# Patient Record
Sex: MALE | Race: WHITE
[De-identification: names, ages, dates, MRNs, and addresses within clinical notes are randomized per-mention and may not be internally consistent; named-entity substitution may affect disease eponyms.]

---

## 2018-01-19 ENCOUNTER — HOSPITAL ENCOUNTER (OUTPATIENT)
Dept: HOSPITAL 92 - LABBT | Age: 83
Discharge: HOME | End: 2018-01-19
Attending: INTERNAL MEDICINE
Payer: MEDICARE

## 2018-01-19 DIAGNOSIS — R07.9: ICD-10-CM

## 2018-01-19 DIAGNOSIS — Z01.810: Primary | ICD-10-CM

## 2018-01-19 LAB
ALBUMIN SERPL BCG-MCNC: 3.8 G/DL (ref 3.4–4.8)
ALP SERPL-CCNC: 76 U/L (ref 40–150)
ALT SERPL W P-5'-P-CCNC: 16 U/L (ref 8–55)
ANION GAP SERPL CALC-SCNC: 13 MMOL/L (ref 10–20)
APTT PPP: 31.7 SEC (ref 22.9–36.1)
AST SERPL-CCNC: 18 U/L (ref 5–34)
BILIRUB SERPL-MCNC: 0.7 MG/DL (ref 0.2–1.2)
BUN SERPL-MCNC: 28 MG/DL (ref 8.4–25.7)
CALCIUM SERPL-MCNC: 8.7 MG/DL (ref 7.8–10.44)
CHLORIDE SERPL-SCNC: 102 MMOL/L (ref 98–107)
CO2 SERPL-SCNC: 23 MMOL/L (ref 23–31)
CREAT CL PREDICTED SERPL C-G-VRATE: 0 ML/MIN (ref 70–130)
GLOBULIN SER CALC-MCNC: 2.3 G/DL (ref 2.4–3.5)
GLUCOSE SERPL-MCNC: 101 MG/DL (ref 83–110)
HGB BLD-MCNC: 13.3 G/DL (ref 14–18)
INR PPP: 1.1
MCH RBC QN AUTO: 33.4 PG (ref 27–31)
MCV RBC AUTO: 100 FL (ref 80–94)
PLATELET # BLD AUTO: 180 THOU/UL (ref 130–400)
POTASSIUM SERPL-SCNC: 5.1 MMOL/L (ref 3.5–5.1)
PROTHROMBIN TIME: 14 SEC (ref 12–14.7)
RBC # BLD AUTO: 3.99 MILL/UL (ref 4.7–6.1)
SODIUM SERPL-SCNC: 133 MMOL/L (ref 136–145)
WBC # BLD AUTO: 6 THOU/UL (ref 4.8–10.8)

## 2018-01-19 PROCEDURE — 85730 THROMBOPLASTIN TIME PARTIAL: CPT

## 2018-01-19 PROCEDURE — 93005 ELECTROCARDIOGRAM TRACING: CPT

## 2018-01-19 PROCEDURE — 85610 PROTHROMBIN TIME: CPT

## 2018-01-19 PROCEDURE — 80053 COMPREHEN METABOLIC PANEL: CPT

## 2018-01-19 PROCEDURE — 85027 COMPLETE CBC AUTOMATED: CPT

## 2018-01-19 PROCEDURE — 93010 ELECTROCARDIOGRAM REPORT: CPT

## 2018-01-20 NOTE — EKG
Test Reason : 

Blood Pressure : ***/*** mmHG

Vent. Rate : 063 BPM     Atrial Rate : 063 BPM

   P-R Int : 226 ms          QRS Dur : 112 ms

    QT Int : 414 ms       P-R-T Axes : 069 -57 044 degrees

   QTc Int : 423 ms

 

Sinus rhythm with 1st degree A-V block

Left anterior fascicular block

Abnormal ECG

When compared with ECG of 03-MAR-2016 13:43,

No significant change was found

Confirmed by VANITA LEWIS, DR. SAlfonso (4) on 1/20/2018 7:16:59 AM

 

Referred By:  ANEL           Confirmed By:DR. HANH WALDRON MD

## 2018-01-23 ENCOUNTER — HOSPITAL ENCOUNTER (OUTPATIENT)
Dept: HOSPITAL 92 - CCL | Age: 83
End: 2018-01-23
Attending: INTERNAL MEDICINE
Payer: MEDICARE

## 2018-01-23 VITALS — BODY MASS INDEX: 27.4 KG/M2

## 2018-01-23 DIAGNOSIS — G47.30: ICD-10-CM

## 2018-01-23 DIAGNOSIS — I48.1: ICD-10-CM

## 2018-01-23 DIAGNOSIS — Z98.890: ICD-10-CM

## 2018-01-23 DIAGNOSIS — I25.10: ICD-10-CM

## 2018-01-23 DIAGNOSIS — Z99.89: ICD-10-CM

## 2018-01-23 DIAGNOSIS — R07.89: Primary | ICD-10-CM

## 2018-01-23 DIAGNOSIS — Z90.49: ICD-10-CM

## 2018-01-23 DIAGNOSIS — I10: ICD-10-CM

## 2018-01-23 LAB
CHD RISK SERPL-RTO: 2.6 (ref ?–4.5)
CHOLEST SERPL-MCNC: 125 MG/DL
HDLC SERPL-MCNC: 48 MG/DL
LDLC SERPL CALC-MCNC: 64 MG/DL
TRIGL SERPL-MCNC: 67 MG/DL (ref ?–150)

## 2018-01-23 PROCEDURE — 80061 LIPID PANEL: CPT

## 2018-01-23 PROCEDURE — C1769 GUIDE WIRE: HCPCS

## 2018-01-23 PROCEDURE — 76942 ECHO GUIDE FOR BIOPSY: CPT

## 2018-01-23 PROCEDURE — 93567 NJX CAR CTH SPRVLV AORTGRPHY: CPT

## 2018-01-23 PROCEDURE — 93455 CORONARY ART/GRFT ANGIO S&I: CPT

## 2018-07-05 ENCOUNTER — HOSPITAL ENCOUNTER (EMERGENCY)
Dept: HOSPITAL 92 - SCSER | Age: 83
Discharge: HOME | End: 2018-07-05
Payer: MEDICARE

## 2018-07-05 DIAGNOSIS — S00.31XA: ICD-10-CM

## 2018-07-05 DIAGNOSIS — S00.81XA: ICD-10-CM

## 2018-07-05 DIAGNOSIS — K21.9: ICD-10-CM

## 2018-07-05 DIAGNOSIS — S09.90XA: Primary | ICD-10-CM
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
## 2018-07-05 DIAGNOSIS — S61.411A: ICD-10-CM

## 2018-07-05 PROCEDURE — 70450 CT HEAD/BRAIN W/O DYE: CPT

## 2018-07-05 NOTE — CT
CT BRAIN WITHOUT CONTRAST:

 

Comparison: None. 

 

History: Fall. Head trauma. Tripped over a dog at home. Patient had no severe trauma to the forehead.
 

 

Technique: Multiple contiguous axial images were obtained in a CT of the brain without contrast. 

 

FINDINGS: 

The brain is normal in morphology and attenuation without focal lesions or confluent areas of infarct
ion. There is no evidence of hydrocephalus, intracranial hemorrhage or extraaxial fluid collection. 

 

Soft tissue swelling is seen in the right frontal scalp. The underlying calvarium is unremarkable. Th
e visualized paranasal sinuses and mastoid air cells are well aerated. 

 

IMPRESSION: 

No evidence of acute intracranial abnormality.

 

POS: [facility]

## 2019-04-28 ENCOUNTER — HOSPITAL ENCOUNTER (INPATIENT)
Dept: HOSPITAL 92 - SCSER | Age: 84
LOS: 4 days | Discharge: HOME | DRG: 872 | End: 2019-05-02
Attending: HOSPITALIST | Admitting: HOSPITALIST
Payer: MEDICARE

## 2019-04-28 VITALS — BODY MASS INDEX: 30.3 KG/M2

## 2019-04-28 DIAGNOSIS — E87.1: ICD-10-CM

## 2019-04-28 DIAGNOSIS — G47.33: ICD-10-CM

## 2019-04-28 DIAGNOSIS — Z85.3: ICD-10-CM

## 2019-04-28 DIAGNOSIS — Z79.82: ICD-10-CM

## 2019-04-28 DIAGNOSIS — Z79.899: ICD-10-CM

## 2019-04-28 DIAGNOSIS — Z90.49: ICD-10-CM

## 2019-04-28 DIAGNOSIS — K21.9: ICD-10-CM

## 2019-04-28 DIAGNOSIS — G25.81: ICD-10-CM

## 2019-04-28 DIAGNOSIS — A41.9: Primary | ICD-10-CM

## 2019-04-28 DIAGNOSIS — R19.7: ICD-10-CM

## 2019-04-28 DIAGNOSIS — Z88.8: ICD-10-CM

## 2019-04-28 DIAGNOSIS — Z95.1: ICD-10-CM

## 2019-04-28 DIAGNOSIS — Z66: ICD-10-CM

## 2019-04-28 DIAGNOSIS — I25.10: ICD-10-CM

## 2019-04-28 DIAGNOSIS — E66.9: ICD-10-CM

## 2019-04-28 LAB
ALBUMIN SERPL BCG-MCNC: 4 G/DL (ref 3.4–4.8)
ALP SERPL-CCNC: 81 U/L (ref 40–150)
ALT SERPL W P-5'-P-CCNC: 19 U/L (ref 8–55)
ANION GAP SERPL CALC-SCNC: 14 MMOL/L (ref 10–20)
AST SERPL-CCNC: 15 U/L (ref 5–34)
BASOPHILS # BLD AUTO: 0.1 THOU/UL (ref 0–0.2)
BASOPHILS NFR BLD AUTO: 0.7 % (ref 0–1)
BILIRUB SERPL-MCNC: 1 MG/DL (ref 0.2–1.2)
BUN SERPL-MCNC: 27 MG/DL (ref 8.4–25.7)
CALCIUM SERPL-MCNC: 9.2 MG/DL (ref 7.8–10.44)
CHLORIDE SERPL-SCNC: 100 MMOL/L (ref 98–107)
CK MB SERPL-MCNC: 1.1 NG/ML (ref 0–6.6)
CO2 SERPL-SCNC: 22 MMOL/L (ref 23–31)
CREAT CL PREDICTED SERPL C-G-VRATE: 0 ML/MIN (ref 70–130)
EOSINOPHIL # BLD AUTO: 0 THOU/UL (ref 0–0.7)
EOSINOPHIL NFR BLD AUTO: 0.1 % (ref 0–10)
GLOBULIN SER CALC-MCNC: 3 G/DL (ref 2.4–3.5)
GLUCOSE SERPL-MCNC: 121 MG/DL (ref 83–110)
HGB BLD-MCNC: 12.3 G/DL (ref 14–18)
LIPASE SERPL-CCNC: 19 U/L (ref 8–78)
LYMPHOCYTES # BLD: 0.8 THOU/UL (ref 1.2–3.4)
LYMPHOCYTES NFR BLD AUTO: 8.6 % (ref 21–51)
MACROCYTES BLD QL SMEAR: (no result) (100X)
MCH RBC QN AUTO: 34.6 PG (ref 27–31)
MCV RBC AUTO: 99.1 FL (ref 78–98)
MDIFF COMPLETE?: YES
MONOCYTES # BLD AUTO: 0.7 THOU/UL (ref 0.11–0.59)
MONOCYTES NFR BLD AUTO: 7.2 % (ref 0–10)
NEUTROPHILS # BLD AUTO: 7.8 THOU/UL (ref 1.4–6.5)
NEUTROPHILS NFR BLD AUTO: 83.4 % (ref 42–75)
PLATELET # BLD AUTO: 142 THOU/UL (ref 130–400)
POTASSIUM SERPL-SCNC: 4.3 MMOL/L (ref 3.5–5.1)
PROT UR STRIP.AUTO-MCNC: 30 MG/DL
RBC # BLD AUTO: 3.56 MILL/UL (ref 4.7–6.1)
SODIUM SERPL-SCNC: 132 MMOL/L (ref 136–145)
SP GR UR STRIP: 1.02 (ref 1–1.03)
WBC # BLD AUTO: 9.4 THOU/UL (ref 4.8–10.8)

## 2019-04-28 PROCEDURE — 83690 ASSAY OF LIPASE: CPT

## 2019-04-28 PROCEDURE — 93005 ELECTROCARDIOGRAM TRACING: CPT

## 2019-04-28 PROCEDURE — 81015 MICROSCOPIC EXAM OF URINE: CPT

## 2019-04-28 PROCEDURE — 83605 ASSAY OF LACTIC ACID: CPT

## 2019-04-28 PROCEDURE — 80202 ASSAY OF VANCOMYCIN: CPT

## 2019-04-28 PROCEDURE — 36415 COLL VENOUS BLD VENIPUNCTURE: CPT

## 2019-04-28 PROCEDURE — 96375 TX/PRO/DX INJ NEW DRUG ADDON: CPT

## 2019-04-28 PROCEDURE — 80048 BASIC METABOLIC PNL TOTAL CA: CPT

## 2019-04-28 PROCEDURE — 87449 NOS EACH ORGANISM AG IA: CPT

## 2019-04-28 PROCEDURE — 87324 CLOSTRIDIUM AG IA: CPT

## 2019-04-28 PROCEDURE — 80053 COMPREHEN METABOLIC PANEL: CPT

## 2019-04-28 PROCEDURE — 87081 CULTURE SCREEN ONLY: CPT

## 2019-04-28 PROCEDURE — 87804 INFLUENZA ASSAY W/OPTIC: CPT

## 2019-04-28 PROCEDURE — 96361 HYDRATE IV INFUSION ADD-ON: CPT

## 2019-04-28 PROCEDURE — 87086 URINE CULTURE/COLONY COUNT: CPT

## 2019-04-28 PROCEDURE — 81003 URINALYSIS AUTO W/O SCOPE: CPT

## 2019-04-28 PROCEDURE — 87633 RESP VIRUS 12-25 TARGETS: CPT

## 2019-04-28 PROCEDURE — 87040 BLOOD CULTURE FOR BACTERIA: CPT

## 2019-04-28 PROCEDURE — 87430 STREP A AG IA: CPT

## 2019-04-28 PROCEDURE — 84484 ASSAY OF TROPONIN QUANT: CPT

## 2019-04-28 PROCEDURE — 71046 X-RAY EXAM CHEST 2 VIEWS: CPT

## 2019-04-28 PROCEDURE — 96365 THER/PROPH/DIAG IV INF INIT: CPT

## 2019-04-28 PROCEDURE — 84443 ASSAY THYROID STIM HORMONE: CPT

## 2019-04-28 PROCEDURE — 83880 ASSAY OF NATRIURETIC PEPTIDE: CPT

## 2019-04-28 PROCEDURE — 82553 CREATINE MB FRACTION: CPT

## 2019-04-28 PROCEDURE — 85025 COMPLETE CBC W/AUTO DIFF WBC: CPT

## 2019-04-28 NOTE — RAD
PA AND LATERAL CHEST:

 

History: Fever. Right breast cancer. 

 

FINDINGS: 

Comparison is made with exam of 9-24-15. 

 

There are changes of median sternotomy. The heart size is enlarged. The aorta is tortuous. The lungs 
are expanded without lobar consolidation, pneumothorax, sonia pulmonary edema or pleural effusions. T
here are degenerative changes in the acromioclavicular joints.  

 

IMPRESSION: 

No acute process. 

 

POS: [facility]

## 2019-04-29 LAB
HYALINE CASTS #/AREA URNS LPF: (no result) LPF
RBC UR QL AUTO: (no result) HPF (ref 0–3)
TROPONIN I SERPL DL<=0.01 NG/ML-MCNC: 0.02 NG/ML (ref ?–0.03)
TROPONIN I SERPL DL<=0.01 NG/ML-MCNC: 0.03 NG/ML (ref ?–0.03)
WBC UR QL AUTO: (no result) HPF (ref 0–3)

## 2019-04-29 RX ADMIN — VANCOMYCIN HYDROCHLORIDE SCH MLS: 1 INJECTION, POWDER, LYOPHILIZED, FOR SOLUTION INTRAVENOUS at 17:04

## 2019-04-29 NOTE — HP
PRIMARY CARE PROVIDER:  Dr. Dave Monterroso.



CHIEF COMPLAINT:  Fever.



HISTORY OF PRESENT ILLNESS:  Mr. Neal is a pleasant 87-year-old gentleman, 
who was

seen at [facility] on April 29 2019, following transfer 
from

[facility]. 



He reports that he was diagnosed with right foot infection approximately 10 days

ago.  He was started on an antibiotic.  Emergency room records state that he was

started on Bactrim.  The patient thinks he may have been on amoxicillin.  He 
reports

completing eight days of treatment, out of a 10-day course.  Yesterday, he 
started

having fevers.  He also felt nauseous twice, but did not vomit.  He felt 
generally

weak.  He denies any chest pain or palpitations.  He denies any 
lightheadedness.  He

presented to the emergency room because of fevers. 



In the emergency room, he was diagnosed with sepsis and started on vancomycin 
and

Zosyn, and admitted to the hospital. 



He denies any cough or shortness of breath.



REVIEW OF SYSTEMS:  All other systems reviewed and found to be negative.



PAST MEDICAL HISTORY:  

1. Obstructive sleep apnea syndrome.

2. Gastroesophageal reflux disease.

3. Restless legs syndrome.

4. Right-sided breast cancer.



PAST SURGICAL HISTORY:  Right mastectomy, transurethral resection of prostate,

appendectomy, cholecystectomy, coronary artery bypass graft. 



SOCIAL HISTORY:  The patient denies tobacco use, alcohol use, or recreational 
drug

use. 



ALLERGIES:  MYCINS.



CODE STATUS:  I discussed his code status.  He is DNAR.



CURRENT MEDICATIONS:  

1. Pramipexole 1.5 mg daily.

2. Aspirin 325 mg daily.

3. Atorvastatin 20 mg daily.

4. Metoprolol tartrate 50 mg 2 times a day.

5. Lisinopril 5 mg daily.

6. Melatonin 10 mg daily.

7. Calcipotriene-betamethasone topical as needed..

8. Alclometasone cream as needed.

9. Culturelle one capsule daily.

10. Isosorbide mononitrate 60 mg daily.

11. Bactrim DS one tablet two times a day.

12. Simvastatin 40 mg at bedtime.

13. Toprol-XL 25 mg 3 times a day.

14. Mirapex 0.5 mg daily.



FAMILY HISTORY:  No family history of premature coronary artery disease.



PHYSICAL EXAMINATION:

GENERAL:  On examination, Mr. Neal is awake and alert, not in acute distress.
  He

is obese, with a BMI of 30.3. 

VITAL SIGNS:  He has a blood pressure of 154/67, pulse 87, respiratory rate 16, 
and

oxygen saturation 93% on room air.  He is currently afebrile.  In the emergency

room, he had temperature of 102.6 degrees Fahrenheit and pulse of 95. 

EYES:  No scleral icterus, no conjunctival pallor. 

ENT:  Dry mucosal membranes.  No oropharyngeal erythema or exudates. 

NECK:  Supple, nontender, trachea is midline. 

RESPIRATORY:  Accessory muscles of breathing are not active.  Chest wall 
movements

are symmetric bilaterally.  Lungs are clear to auscultation without wheeze, 
rhonchi,

or crepitations. 

CARDIOVASCULAR:  S1 and S2 are heard, regular.  Peripheral pulses palpable.  No

carotid bruit.  No pericardial rub. 

ABDOMEN:  Soft, nontender, bowel sounds are heard, no hepatomegaly, no 
splenomegaly. 

NEUROLOGIC:  Cranial nerves 2 through 12 intact, deep tendon reflexes 2+. 

MUSCULOSKELETAL:  Power is 5/5 in all 4 extremities. 

SKIN:  Small area of faint erythema over the right shin, no elevated warmth.  No

neurovascular deficits in that extremity. 

LYMPHATIC:  No cervical lymphadenopathy. 

PSYCHIATRIC:  Normal mood, normal affect, the patient is oriented to person, 
place,

and time. 



LABORATORY DATA:  Mr. Neal's labs and investigations were reviewed.  I 
reviewed

his electrocardiogram, which shows normal sinus rhythm, no ST changes to 
suggest an

acute coronary syndrome.  I also reviewed his chest x-ray, which does not show 
any

pulmonary infiltrates.  He has normal white count, elevated neutrophil 
percentage of

83.4, macrocytic anemia with hemoglobin 12.3, normal platelet count.  
Hyponatremia

with sodium 132, normal potassium, elevated blood urea nitrogen of 27, normal

creatinine, unremarkable liver profile, normal lipase, BNP elevated at 274,

troponin-I initially in the indeterminate range at 0.029, normalizing to 0.022,

normal lactic acid level of 1.0, and urinalysis, that is negative for nitrite 
and

leukocyte esterase. 



ASSESSMENT AND PLAN:  Mr. Neal is a pleasant 87-year-old gentleman, who was 
seen

at [facility] on April 29, 2019.  His problem list 
includes: 

1. Sepsis:  Mr. Neal is presenting with sepsis, source unclear at this time.  
He

was recently treated with Bactrim.  He will be admitted to the hospital for 
further

management.  He has been started on vancomycin and Zosyn, which I will continue.

Please note that his group A strep screen was negative.  His influenza screen 
was

negative as well.  He has recently been treated with cellulitis and it does not

appear that he has significant area of cellulitis at this time. 

2. Coronary artery disease:  Stable.  The patient denies any chest pain.

3. Gastroesophageal reflux disease:  Stable.

4. Restless legs syndrome:  We will continue home medications including Mirapex.

5. Hyponatremia:  Mild, likely asymptomatic, we will recheck.

6. Generalized weakness:  We will evaluate his mobility after he receives

antibiotics. 

Many thanks for allowing me to participate in your patient's care.  Please feel 
free

to contact me with any questions or concerns. 



LEVEL OF RISK:  High.



LEVEL OF COMPLEXITY:  High.







Job ID:  [id number]



[facility]

## 2019-04-30 LAB
ANION GAP SERPL CALC-SCNC: 12 MMOL/L (ref 10–20)
BASOPHILS # BLD AUTO: 0 THOU/UL (ref 0–0.2)
BASOPHILS NFR BLD AUTO: 0.6 % (ref 0–1)
BUN SERPL-MCNC: 22 MG/DL (ref 8.4–25.7)
CALCIUM SERPL-MCNC: 8 MG/DL (ref 7.8–10.44)
CHLORIDE SERPL-SCNC: 102 MMOL/L (ref 98–107)
CO2 SERPL-SCNC: 20 MMOL/L (ref 23–31)
CREAT CL PREDICTED SERPL C-G-VRATE: 62 ML/MIN (ref 70–130)
EOSINOPHIL # BLD AUTO: 0 THOU/UL (ref 0–0.7)
EOSINOPHIL NFR BLD AUTO: 0.4 % (ref 0–10)
GLUCOSE SERPL-MCNC: 112 MG/DL (ref 83–110)
HGB BLD-MCNC: 10.8 G/DL (ref 14–18)
LYMPHOCYTES # BLD: 0.4 THOU/UL (ref 1.2–3.4)
LYMPHOCYTES NFR BLD AUTO: 5.6 % (ref 21–51)
MCH RBC QN AUTO: 33.5 PG (ref 27–31)
MCV RBC AUTO: 103 FL (ref 78–98)
MONOCYTES # BLD AUTO: 0.6 THOU/UL (ref 0.11–0.59)
MONOCYTES NFR BLD AUTO: 8.2 % (ref 0–10)
NEUTROPHILS # BLD AUTO: 6.4 THOU/UL (ref 1.4–6.5)
NEUTROPHILS NFR BLD AUTO: 85.3 % (ref 42–75)
PLATELET # BLD AUTO: 148 THOU/UL (ref 130–400)
POTASSIUM SERPL-SCNC: 4.3 MMOL/L (ref 3.5–5.1)
RBC # BLD AUTO: 3.22 MILL/UL (ref 4.7–6.1)
SODIUM SERPL-SCNC: 130 MMOL/L (ref 136–145)
VANCOMYCIN TROUGH SERPL-MCNC: 6.9 UG/ML
WBC # BLD AUTO: 7.5 THOU/UL (ref 4.8–10.8)

## 2019-04-30 RX ADMIN — LACTOBACILLUS ACIDOPH-L.BULGARICUS 1 MILLION CELL CHEWABLE TABLET SCH TAB: at 09:03

## 2019-04-30 RX ADMIN — VANCOMYCIN HYDROCHLORIDE SCH MLS: 1 INJECTION, POWDER, LYOPHILIZED, FOR SOLUTION INTRAVENOUS at 18:37

## 2019-05-01 LAB
ANION GAP SERPL CALC-SCNC: 11 MMOL/L (ref 10–20)
BASOPHILS # BLD AUTO: 0 THOU/UL (ref 0–0.2)
BASOPHILS NFR BLD AUTO: 0 % (ref 0–1)
BUN SERPL-MCNC: 20 MG/DL (ref 8.4–25.7)
CALCIUM SERPL-MCNC: 8 MG/DL (ref 7.8–10.44)
CHLORIDE SERPL-SCNC: 102 MMOL/L (ref 98–107)
CO2 SERPL-SCNC: 19 MMOL/L (ref 23–31)
CREAT CL PREDICTED SERPL C-G-VRATE: 78 ML/MIN (ref 70–130)
EOSINOPHIL # BLD AUTO: 0.1 THOU/UL (ref 0–0.7)
EOSINOPHIL NFR BLD AUTO: 1.7 % (ref 0–10)
GLUCOSE SERPL-MCNC: 95 MG/DL (ref 83–110)
HGB BLD-MCNC: 10.6 G/DL (ref 14–18)
LYMPHOCYTES # BLD: 0.6 THOU/UL (ref 1.2–3.4)
LYMPHOCYTES NFR BLD AUTO: 11.1 % (ref 21–51)
MCH RBC QN AUTO: 35.4 PG (ref 27–31)
MCV RBC AUTO: 104 FL (ref 78–98)
MONOCYTES # BLD AUTO: 0.5 THOU/UL (ref 0.11–0.59)
MONOCYTES NFR BLD AUTO: 10.2 % (ref 0–10)
NEUTROPHILS # BLD AUTO: 3.9 THOU/UL (ref 1.4–6.5)
NEUTROPHILS NFR BLD AUTO: 77.1 % (ref 42–75)
PLATELET # BLD AUTO: 141 THOU/UL (ref 130–400)
POTASSIUM SERPL-SCNC: 4 MMOL/L (ref 3.5–5.1)
RBC # BLD AUTO: 3 MILL/UL (ref 4.7–6.1)
SODIUM SERPL-SCNC: 128 MMOL/L (ref 136–145)
WBC # BLD AUTO: 5 THOU/UL (ref 4.8–10.8)

## 2019-05-01 RX ADMIN — LACTOBACILLUS ACIDOPH-L.BULGARICUS 1 MILLION CELL CHEWABLE TABLET SCH TAB: at 08:53

## 2019-05-02 VITALS — DIASTOLIC BLOOD PRESSURE: 65 MMHG | TEMPERATURE: 97.9 F | SYSTOLIC BLOOD PRESSURE: 123 MMHG

## 2019-05-02 LAB
ANION GAP SERPL CALC-SCNC: 13 MMOL/L (ref 10–20)
BASOPHILS # BLD AUTO: 0 THOU/UL (ref 0–0.2)
BASOPHILS NFR BLD AUTO: 0.4 % (ref 0–1)
BUN SERPL-MCNC: 18 MG/DL (ref 8.4–25.7)
CALCIUM SERPL-MCNC: 8.3 MG/DL (ref 7.8–10.44)
CHLORIDE SERPL-SCNC: 101 MMOL/L (ref 98–107)
CO2 SERPL-SCNC: 18 MMOL/L (ref 23–31)
CREAT CL PREDICTED SERPL C-G-VRATE: 84 ML/MIN (ref 70–130)
EOSINOPHIL # BLD AUTO: 0.1 THOU/UL (ref 0–0.7)
EOSINOPHIL NFR BLD AUTO: 2.8 % (ref 0–10)
GLUCOSE SERPL-MCNC: 94 MG/DL (ref 83–110)
HGB BLD-MCNC: 11.2 G/DL (ref 14–18)
LYMPHOCYTES # BLD: 0.6 THOU/UL (ref 1.2–3.4)
LYMPHOCYTES NFR BLD AUTO: 14.6 % (ref 21–51)
MCH RBC QN AUTO: 34.9 PG (ref 27–31)
MCV RBC AUTO: 101 FL (ref 78–98)
MONOCYTES # BLD AUTO: 0.4 THOU/UL (ref 0.11–0.59)
MONOCYTES NFR BLD AUTO: 10.6 % (ref 0–10)
NEUTROPHILS # BLD AUTO: 2.9 THOU/UL (ref 1.4–6.5)
NEUTROPHILS NFR BLD AUTO: 71.6 % (ref 42–75)
PLATELET # BLD AUTO: 163 THOU/UL (ref 130–400)
POTASSIUM SERPL-SCNC: 4 MMOL/L (ref 3.5–5.1)
RBC # BLD AUTO: 3.22 MILL/UL (ref 4.7–6.1)
SODIUM SERPL-SCNC: 128 MMOL/L (ref 136–145)
WBC # BLD AUTO: 4 THOU/UL (ref 4.8–10.8)

## 2019-05-02 RX ADMIN — LACTOBACILLUS ACIDOPH-L.BULGARICUS 1 MILLION CELL CHEWABLE TABLET SCH TAB: at 09:37

## 2019-05-03 NOTE — DIS
DATE OF ADMISSION:  04/29/2019



DATE OF DISCHARGE:  05/02/2019



PRIMARY CARE PROVIDER:  Dave Monterroso MD.



DISCHARGE DIAGNOSES:  

1. Sepsis.

2. Most likely viral etiology for sepsis.

3. Diarrhea, resolved.



CONDITION OF PATIENT ON THE DAY OF DISCHARGE:  Patient on the day of discharge:

Stable.  I assessed Mr. Neal on the day of discharge.  He denies any chest pain or

shortness of breath.  Vital signs are stable.  S1 and S2 are heard, regular.  Lungs

are clear to auscultation bilaterally. 



DISCHARGE MEDICATIONS:  No change was made to his pre-admission home medications as

dictated on my history and physical note dated April 29, 2019. 



HOSPITAL COURSE:  Mr. Neal is a pleasant 87-year-old gentleman, who was admitted

to [facility] on April 29th, 2019, for sepsis.  He was

admitted to the hospital and treated with intravenous antibiotics.  Preliminary

blood cultures were negative at 48 hours, the patient is advised to follow up with

his primary care provider for final blood culture report.  Group A Streptococcus

culture from the throat did not show any group A Streptococcus isolate.  Final urine

culture did not show any growth at 48 hours.  Influenza screen was negative.

Respiratory virus panel was also negative.  He had diarrhea towards the end of this

hospitalization.  Clostridium difficile toxin test was negative.  He improved with

probiotics. 



Antibiotics were discontinued and the patient was observed in the hospital for one

day.  He did not have any fevers.  He is being discharged home in a stable

condition. 



On the day of discharge, he has sodium 128.  He has chronic hyponatremia and has

been advised to have his sodium level rechecked through his primary care provider's

office.  On the day of discharge, his creatinine is 0.82, white count 4000,

hemoglobin 11.2, and platelet count 163,000.  His TSH was normal at 1.394. 



Many thanks for allowing me to participate in your patient's care.  Please feel free

to contact me with any questions or concerns. 



DISCHARGE DESTINATION:  Home.



TIME SPENT:  Total amount of time spent coordinating this discharge:  32 minutes.







Job ID:  [id number]

## 2020-08-31 ENCOUNTER — HOSPITAL ENCOUNTER (INPATIENT)
Dept: HOSPITAL 92 - ERS | Age: 85
LOS: 2 days | Discharge: HOME | DRG: 683 | End: 2020-09-02
Attending: FAMILY MEDICINE | Admitting: FAMILY MEDICINE
Payer: MEDICARE

## 2020-08-31 VITALS — BODY MASS INDEX: 26.9 KG/M2

## 2020-08-31 DIAGNOSIS — K21.9: ICD-10-CM

## 2020-08-31 DIAGNOSIS — Z90.49: ICD-10-CM

## 2020-08-31 DIAGNOSIS — N18.3: ICD-10-CM

## 2020-08-31 DIAGNOSIS — Z20.828: ICD-10-CM

## 2020-08-31 DIAGNOSIS — D63.1: ICD-10-CM

## 2020-08-31 DIAGNOSIS — G25.81: ICD-10-CM

## 2020-08-31 DIAGNOSIS — E87.2: ICD-10-CM

## 2020-08-31 DIAGNOSIS — N17.9: Primary | ICD-10-CM

## 2020-08-31 DIAGNOSIS — Z95.1: ICD-10-CM

## 2020-08-31 DIAGNOSIS — G47.33: ICD-10-CM

## 2020-08-31 DIAGNOSIS — I25.10: ICD-10-CM

## 2020-08-31 DIAGNOSIS — Z85.3: ICD-10-CM

## 2020-08-31 DIAGNOSIS — I12.9: ICD-10-CM

## 2020-08-31 DIAGNOSIS — Z79.899: ICD-10-CM

## 2020-08-31 DIAGNOSIS — E87.5: ICD-10-CM

## 2020-08-31 DIAGNOSIS — E86.0: ICD-10-CM

## 2020-08-31 DIAGNOSIS — I95.9: ICD-10-CM

## 2020-08-31 LAB
ALBUMIN SERPL BCG-MCNC: 3.9 G/DL (ref 3.4–4.8)
ALP SERPL-CCNC: 66 U/L (ref 40–110)
ALT SERPL W P-5'-P-CCNC: 12 U/L (ref 8–55)
ANION GAP SERPL CALC-SCNC: 14 MMOL/L (ref 10–20)
AST SERPL-CCNC: 13 U/L (ref 5–34)
BASOPHILS # BLD AUTO: 0 THOU/UL (ref 0–0.2)
BASOPHILS NFR BLD AUTO: 0.5 % (ref 0–1)
BILIRUB SERPL-MCNC: 0.5 MG/DL (ref 0.2–1.2)
BUN SERPL-MCNC: 52 MG/DL (ref 8.4–25.7)
CALCIUM SERPL-MCNC: 8.5 MG/DL (ref 7.8–10.44)
CHLORIDE SERPL-SCNC: 108 MMOL/L (ref 98–107)
CO2 SERPL-SCNC: 20 MMOL/L (ref 23–31)
CREAT CL PREDICTED SERPL C-G-VRATE: 0 ML/MIN (ref 70–130)
EOSINOPHIL # BLD AUTO: 0 THOU/UL (ref 0–0.7)
EOSINOPHIL NFR BLD AUTO: 1 % (ref 0–10)
GLOBULIN SER CALC-MCNC: 2.6 G/DL (ref 2.4–3.5)
GLUCOSE SERPL-MCNC: 128 MG/DL (ref 83–110)
HGB BLD-MCNC: 10.4 G/DL (ref 14–18)
LYMPHOCYTES # BLD: 0.9 THOU/UL (ref 1.2–3.4)
LYMPHOCYTES NFR BLD AUTO: 19.2 % (ref 21–51)
MACROCYTES BLD QL SMEAR: (no result) (100X)
MCH RBC QN AUTO: 36.1 PG (ref 27–31)
MCV RBC AUTO: 107 FL (ref 78–98)
MDIFF COMPLETE?: YES
MONOCYTES # BLD AUTO: 0.3 THOU/UL (ref 0.11–0.59)
MONOCYTES NFR BLD AUTO: 6.9 % (ref 0–10)
NEUTROPHILS # BLD AUTO: 3.3 THOU/UL (ref 1.4–6.5)
NEUTROPHILS NFR BLD AUTO: 72.5 % (ref 42–75)
PLATELET # BLD AUTO: 159 THOU/UL (ref 130–400)
POTASSIUM SERPL-SCNC: 5.8 MMOL/L (ref 3.5–5.1)
RBC # BLD AUTO: 2.89 MILL/UL (ref 4.7–6.1)
SODIUM SERPL-SCNC: 136 MMOL/L (ref 136–145)
WBC # BLD AUTO: 4.6 THOU/UL (ref 4.8–10.8)

## 2020-08-31 PROCEDURE — 82607 VITAMIN B-12: CPT

## 2020-08-31 PROCEDURE — 36600 WITHDRAWAL OF ARTERIAL BLOOD: CPT

## 2020-08-31 PROCEDURE — 80053 COMPREHEN METABOLIC PANEL: CPT

## 2020-08-31 PROCEDURE — 87635 SARS-COV-2 COVID-19 AMP PRB: CPT

## 2020-08-31 PROCEDURE — 93976 VASCULAR STUDY: CPT

## 2020-08-31 PROCEDURE — 82746 ASSAY OF FOLIC ACID SERUM: CPT

## 2020-08-31 PROCEDURE — U0003 INFECTIOUS AGENT DETECTION BY NUCLEIC ACID (DNA OR RNA); SEVERE ACUTE RESPIRATORY SYNDROME CORONAVIRUS 2 (SARS-COV-2) (CORONAVIRUS DISEASE [COVID-19]), AMPLIFIED PROBE TECHNIQUE, MAKING USE OF HIGH THROUGHPUT TECHNOLOGIES AS DESCRIBED BY CMS-2020-01-R: HCPCS

## 2020-08-31 PROCEDURE — 80048 BASIC METABOLIC PNL TOTAL CA: CPT

## 2020-08-31 PROCEDURE — 36415 COLL VENOUS BLD VENIPUNCTURE: CPT

## 2020-08-31 PROCEDURE — G0378 HOSPITAL OBSERVATION PER HR: HCPCS

## 2020-08-31 PROCEDURE — 85025 COMPLETE CBC W/AUTO DIFF WBC: CPT

## 2020-08-31 PROCEDURE — 93005 ELECTROCARDIOGRAM TRACING: CPT

## 2020-08-31 PROCEDURE — 84484 ASSAY OF TROPONIN QUANT: CPT

## 2020-08-31 PROCEDURE — 76770 US EXAM ABDO BACK WALL COMP: CPT

## 2020-09-01 LAB
ANION GAP SERPL CALC-SCNC: 12 MMOL/L (ref 10–20)
BASOPHILS # BLD AUTO: 0 THOU/UL (ref 0–0.2)
BASOPHILS NFR BLD AUTO: 0.9 % (ref 0–1)
BUN SERPL-MCNC: 40 MG/DL (ref 8.4–25.7)
CALCIUM SERPL-MCNC: 8.1 MG/DL (ref 7.8–10.44)
CHLORIDE SERPL-SCNC: 110 MMOL/L (ref 98–107)
CO2 SERPL-SCNC: 20 MMOL/L (ref 23–31)
CREAT CL PREDICTED SERPL C-G-VRATE: 45 ML/MIN (ref 70–130)
EOSINOPHIL # BLD AUTO: 0.1 THOU/UL (ref 0–0.7)
EOSINOPHIL NFR BLD AUTO: 1.3 % (ref 0–10)
GLUCOSE SERPL-MCNC: 83 MG/DL (ref 83–110)
HGB BLD-MCNC: 10.4 G/DL (ref 14–18)
LYMPHOCYTES # BLD: 1.1 THOU/UL (ref 1.2–3.4)
LYMPHOCYTES NFR BLD AUTO: 24.7 % (ref 21–51)
MCH RBC QN AUTO: 35.2 PG (ref 27–31)
MCV RBC AUTO: 107 FL (ref 78–98)
MONOCYTES # BLD AUTO: 0.4 THOU/UL (ref 0.11–0.59)
MONOCYTES NFR BLD AUTO: 9.3 % (ref 0–10)
NEUTROPHILS # BLD AUTO: 2.9 THOU/UL (ref 1.4–6.5)
NEUTROPHILS NFR BLD AUTO: 63.8 % (ref 42–75)
PLATELET # BLD AUTO: 151 THOU/UL (ref 130–400)
POTASSIUM SERPL-SCNC: 5.1 MMOL/L (ref 3.5–5.1)
POTASSIUM SERPL-SCNC: 5.8 MMOL/L (ref 3.5–5.1)
RBC # BLD AUTO: 2.95 MILL/UL (ref 4.7–6.1)
SODIUM SERPL-SCNC: 137 MMOL/L (ref 136–145)
WBC # BLD AUTO: 4.6 THOU/UL (ref 4.8–10.8)

## 2020-09-01 NOTE — PDOC.HOSPP
- Subjective


Encounter Date: 09/01/20


Encounter Time: 16:08


Subjective: 





Mr. Neal was seen today in follow-up of hyperkalemia. He does not have any 

complaints.





- Objective


Vital Signs & Weight: 


 Vital Signs (12 hours)











  Temp Pulse Resp BP Pulse Ox


 


 09/01/20 11:36  97.5 F L  56 L  16  105/49 L  95


 


 09/01/20 08:50      94 L


 


 09/01/20 07:52  97.5 F L  55 L  16  162/64 H  94 L


 


 09/01/20 05:14  97.6 F  60  17  144/64 H  95








 Weight











Weight                         182 lb 4 oz














I&O: 


 











 08/31/20 09/01/20 09/02/20





 06:59 06:59 06:59


 


Intake Total  200 


 


Balance  200 











Result Diagrams: 


 09/01/20 05:14





 09/01/20 15:16





Hospitalist ROS





- Medication


Medications: 


Active Medications











Generic Name Dose Route Start Last Admin





  Trade Name Freq  PRN Reason Stop Dose Admin


 


Aspirin  325 mg  09/01/20 09:00  09/01/20 08:50





  Ecotrin  PO   325 mg





  DAILY LAITH   Administration





     





     





     





     


 


Famotidine  20 mg  09/01/20 09:00  09/01/20 08:50





  Pepcid  PO   20 mg





  0900 LAITH   Administration





     





     





     





     


 


Isosorbide Mononitrate  30 mg  09/01/20 09:00  09/01/20 08:50





  Imdur Er  PO   30 mg





  DAILY LAITH   Administration





     





     





     





     


 


Metoprolol Tartrate  50 mg  09/01/20 09:00  09/01/20 08:50





  Lopressor  PO   50 mg





  BID LAITH   Administration





     





     





     





     


 


Ranolazine  1,000 mg  09/01/20 09:00  09/01/20 08:50





  Ranexa  PO   1,000 mg





  BID LAITH   Administration





     





     





     





     


 


Saccharomyces Boulardii  250 mg  09/01/20 09:00  09/01/20 08:50





  Florastor  PO   250 mg





  DAILY LAITH   Administration





     





     





     





     














- Exam


Eye: PERRL, anicteric sclera


Heart: RRR, no murmur, no gallops, no rubs, normal peripheral pulses


Respiratory: CTAB, no wheezes, no rales, no ronchi, normal chest expansion


Gastrointestinal: soft, non-tender, non-distended, normal bowel sounds, no 

palpable masses, no hepatomegaly


Extremities: no cyanosis, 1+ LE edema (mild edema in the left lower extremity)





Hosp A/P


(1) Hyperkalemia


Code(s): E87.5 - HYPERKALEMIA   Status: Acute   





(2) Chronic kidney disease, stage 3


Code(s): N18.3 - CHRONIC KIDNEY DISEASE, STAGE 3 (MODERATE)   Status: Acute   





(3) S/P CABG x 4


Status: Acute   





(4) CAD (coronary artery disease)


Code(s): I25.10 - ATHSCL HEART DISEASE OF NATIVE CORONARY ARTERY W/O ANG PCTRS 

  Status: Chronic   





(5) GERD (gastroesophageal reflux disease)


Code(s): K21.9 - GASTRO-ESOPHAGEAL REFLUX DISEASE WITHOUT ESOPHAGITIS   Status: 

Chronic   





- Plan





* Hyperkalemia- Will place him on a low potassium diet. He is a bit acidotic, 

and will therefore consult Nephrology - possible RTA?


* Give a dose of Kayexalate


* Acute on chronic kidney disease- patient gives a history of BPH and previous 

TURP. He says he has noted that his stream has become less strong, and he has 

some dribbling- will check a post void residual and renal ultrasound to rule 

out obstruction


* He is not stable for discharge , as his potassium has begun to rise again- 

will change his status to INpatient, and re-check the potassium level in the AM


* CAD- stable- currently being evaluated by Dr. Valdes as Outpatient

## 2020-09-01 NOTE — HP
PRIMARY CARE PROVIDER:  Dr. Dave Monterroso.



PRIMARY CARDIOLOGIST:  Dr. Wes Valdes.



CHIEF COMPLAINT:  Abnormal labs.



HISTORY OF PRESENT ILLNESS:  This is an 88-year-old  male, who was referred

by Dr. Valdes's office after a potassium was noted at 6.2 on screening laboratory

analysis.  The patient denied any specific fever, chills, general weakness, chest

pain, or shortness of breath.  The patient states he essentially had no symptoms and

underwent the screening lab test due to a medication that he takes including

lisinopril and Lasix.  The patient denied any new medications, but states that Dr. Valdes was considering taking him off lisinopril due to the acute kidney injury

noted on metabolic screening.  The patient denied any change to bowel habits,

nausea, vomiting, or dysuria.  In the emergency room, the patient underwent repeat

metabolic screening showing a potassium of 5.8 with elevated creatinine of 1.67 and

estimated GFR of 39.  The patient received intravenous normal saline x500 mL and was

referred to the Hospitalist Service for further evaluation. 



PAST MEDICAL HISTORY:  

1. Obstructive sleep apnea.

2. Gastroesophageal reflux disease.

3. Restless legs syndrome.

4. Right-sided breast cancer.



PAST SURGICAL HISTORY:  

1. Status post right mastectomy.

2. Status post transurethral resection of the prostate.

3. Status post appendectomy.

4. Status post cholecystectomy.

5. Status post coronary artery bypass grafting.



CURRENT MEDICATIONS:  

1. Lipitor 20 mg p.o. at bedtime.

2. Lasix 20 mg p.o. q.48 hours p.r.n.

3. Isosorbide mononitrate 25 mg p.o. daily.

4. Lisinopril 5 mg p.o. daily.

5. Melatonin 10 mg p.o. at bedtime.

6. Metoprolol tartrate 50 mg p.o. b.i.d.

7. Mirapex 1.5 mg p.o. at bedtime.

8. Ranexa 1000 mg p.o. b.i.d.

9. Enteric-coated aspirin 325 mg p.o. daily.

10. Alclometasone 1 application p.r.n.

11. Fluocinolone 1 application b.i.d. p.r.n.



ALLERGIES:  TO MYCINS.



FAMILY HISTORY:  No inheritable diseases per patient report.



SOCIAL HISTORY:  Resides in [place], [place].  .  No current alcohol, tobacco, or

illicit drug use. 



REVIEW OF SYSTEMS:  CONSTITUTIONAL:  Negative for weight loss or gain, ability to

conduct usual activities. 

SKIN:  Negative for rash, itching. 

EYES:  Negative for double vision, pain. 

ENT/MOUTH:  Negative for nose bleeding, neck stiffness, pain, tenderness. 

CARDIOVASCULAR:  Negative for palpitations, dyspnea on exertion, orthopnea. 

RESPIRATORY:  Negative for shortness of breath, wheezing, cough, hemoptysis, fever

or night sweats. 

GASTROINTESTINAL:  Negative for poor appetite, abdominal pain, heartburn, nausea,

vomiting, constipation, or diarrhea. 

GENITOURINARY:  Negative for urgency, frequency, dysuria, nocturia. 

MUSCULOSKELETAL:  Negative for pain, swelling. 

NEUROLOGIC/PSYCHIATRIC:  Negative for anxiety, depression. 

ALLERGY/IMMUNOLOGIC:  Negative for skin rash, bleeding tendency. 



Otherwise negative except as stated per HPI.



PHYSICAL EXAMINATION:

VITAL SIGNS:  Blood pressure 162/68, pulse 66, respiratory rate 20, temperature 97.6

degrees Fahrenheit, O2 saturation 95% on room air. 

GENERAL APPEARANCE:  This is an 88-year-old  male, alert and oriented x3,

pleasant, responsive, in no acute distress. 

HEENT:  Pupils are equal, round, reactive to light and accommodation.  Extraocular

muscles are intact.  No scleral icterus.  No conjunctival injection.  Nares patent.

OP is clear.  Teeth in good repair. 

NECK:  Supple.  No cervical adenopathy.  No thyromegaly.  No carotid bruits.  No JVD

appreciated.  Cervical spine with full active and passive range of motion. 

CHEST:  Lungs are clear to auscultation bilaterally.  CARDIOVASCULAR EXAM:  S1, S2

without noted murmur, rub, or gallop. 

ABDOMEN:  Rounded, soft, nontender, and nondistended.  Bowel sounds are positive in

all 4 quadrants.  There is no hepatosplenomegaly.  No abdominal bruits, no rebound

or guarding appreciated. 

EXTREMITIES:  Warm and dry with fair turgor.  No clubbing, cyanosis, or asymmetric

edema appreciated.  Pulses palpable distally at the dorsalis pedis, posterior

tibial, and popliteal arteries bilaterally.  Capillary refill less than 2 seconds. 

NEUROLOGIC:  Cranial nerves 2 through 12 are grossly intact.  No focal or

lateralizing signs appreciated. 



PERTINENT LABORATORY AND X-RAY FINDINGS:  Sodium 136, potassium 5.8, chloride 108,

CO2 of 20, BUN 52, creatinine 1.67.  Previously 1.70 on 08/28/2020, estimated GFR of

39, glucose 128, calcium 8.5.  Troponin I negative x1.  CBC showed a white blood

cell count of 4.6, hemoglobin 10.4, hematocrit 31, , platelet count 159 with

normal differential.  EKG dated 08/31/2020, by my interpretation shows sinus

mechanism with heart rates in the 60s.  Normal R-wave progression noted in the

precordial leads.  Left axis deviation noted.  No acute ST-T wave changes

appreciated. 



ASSESSMENT/PLAN:  

1. Acute kidney injury.  Suspect iatrogenic in the context of lisinopril with Lasix.

 Suspect also a component of dehydration.  We will initiate intravenous normal

saline at 100 mL/hour.  Avoid nephrotoxic agents and limit contrast exposure.  Hold

Lasix and lisinopril.  Repeat creatinine in the a.m. 

2. Hyperkalemia.  Suspect secondarily to #1.  Continue IV fluids as outlined

previously and repeat potassium level in the a.m. 

3. Chronic macrocytic anemia.  No current evidence to suggest acute blood loss.

Check B12 and folate level in the a.m. and repeat CBC. 

4. Coronary artery disease, chronic and stable.  

5. Resume home medication regimen and monitor clinically.

6. Hypertension.  Resume home blood pressure regimen and monitor clinical response.

7. Prophylaxis.  Sequential compression devices, while in bed.  Pepcid 20 mg p.o.

b.i.d. 



CODE STATUS:  Full.  Surrogate medical decision maker is patient's spouse.







Job ID:  [id number]

## 2020-09-01 NOTE — CON
DATE OF CONSULTATION:  09/01/2020



CONSULTING PHYSICIAN:  Luiz Aranda MD



REASON FOR CONSULTATION:  Hyperkalemia.



REASON FOR ADMISSION:  Abnormal labs.



HISTORY OF PRESENT ILLNESS:  This is an 88-year-old male with history of obstructive

sleep apnea, restless legs syndrome, who was sent to the hospital with abnormal

labs.  He was found to have potassium of 6.2 by the cardiologist, Dr. Valdes.

The patient initially got better, but again potassium was going up today, even

though holding the lisinopril, so Nephrology is consulted.  The patient has been

having some fruits for diet and he was using potassium chloride __________.  No

fever or chills.  No nausea or vomiting.  No cramps.  No chest pain or palpitation

reported. 



PAST MEDICAL HISTORY:  Positive for obstructive sleep apnea, GERD, restless legs

syndrome, and right-sided breast cancer. 



PAST SURGICAL HISTORY:  Right mastectomy, TURP, appendectomy, cholecystectomy, and

coronary artery bypass graft. 



HOME MEDICATIONS:  

1. Lipitor.

2. Lasix.

3. Isosorbide mononitrate.

4. Lisinopril.

5. Melatonin.

6. Metoprolol.

7. Mirapex.

8. Ranexa.

9. Aspirin.

10. Alclometasone.

11. Fluocinolone.



ALLERGIES:  TO MYCINS.



FAMILY HISTORY:  No history of any kidney disease.



SOCIAL HISTORY:  No smoking, alcohol, or illicit drug abuse.



REVIEW OF SYSTEMS:  CONSTITUTIONAL:  Negative for weight loss or gain, ability to

conduct usual activities. 

SKIN:  Negative for rash, itching. 

EYES:  Negative for double vision, pain. 

ENT/MOUTH:  Negative for nose bleeding, neck stiffness, pain, tenderness. 

CARDIOVASCULAR:  Negative for palpitations, dyspnea on exertion, orthopnea. 

RESPIRATORY:  Negative for shortness of breath, wheezing, cough, hemoptysis, fever

or night sweats. 

GASTROINTESTINAL:  Negative for poor appetite, abdominal pain, heartburn, nausea,

vomiting, constipation, or diarrhea. 

GENITOURINARY:  Negative for urgency, frequency, dysuria, nocturia. 

MUSCULOSKELETAL:  Negative for pain, swelling. 

NEUROLOGIC/PSYCHIATRIC:  Negative for anxiety, depression. 

ALLERGY/IMMUNOLOGIC:  Negative for skin rash, bleeding tendency.



PHYSICAL EXAMINATION:

GENERAL:  This is a well-built male, in no apparent distress. 

VITAL SIGNS:  Temperature 97.5, pulse 56, respiratory rate 18, and blood pressure

105/49. 

HEENT:  Atraumatic, normocephalic.  Oral mucosa moist. 

NECK:  Supple. 

CV:  S1 and S2.  Rate and rhythm regular. 

RESPIRATORY:  Clear. 

GASTROINTESTINAL:  Abdomen is soft. 

MUSCULOSKELETAL:  No tenderness.  No edema. 

DERMATOLOGIC:  No skin rash. 

NEUROLOGIC:  Alert and awake. 

PSYCHIATRIC:  Mood and affect normal.



LABORATORY DATA:  Hemoglobin is 10.5.  Potassium 5.8, BUN is 40, and creatinine is

1.34. 



ASSESSMENT AND PLAN:  

1. Acute kidney injury.  Renal function getting better, most likely ACE inhibitor

induced. 

2. Hyperkalemia, better.  We will limit potassium intake and stop lisinopril.  We

will start on bicarb drip and also agree with Kayexalate.  The patient is also

complaining of urinary retention.  We will check a renal ultrasound, might have to

add Doppler also __________. 

3. Acidosis.  We will start bicarb drip.

4. The patient advised to limit potassium intake.

5. Anemia of chronic disease.

6. History of hypertension. 



Monitor potassium closely.  Limit potassium intake.  Hold lisinopril.  Bicarb drip

for a liter and to check renal ultrasound with Doppler if possible. 



Thank you for the consult.  We will follow the case along with you.







Job ID:  [id number]

## 2020-09-02 VITALS — TEMPERATURE: 97.4 F | DIASTOLIC BLOOD PRESSURE: 52 MMHG | SYSTOLIC BLOOD PRESSURE: 103 MMHG

## 2020-09-02 LAB
ANION GAP SERPL CALC-SCNC: 11 MMOL/L (ref 10–20)
BUN SERPL-MCNC: 27 MG/DL (ref 8.4–25.7)
CALCIUM SERPL-MCNC: 8 MG/DL (ref 7.8–10.44)
CHLORIDE SERPL-SCNC: 106 MMOL/L (ref 98–107)
CO2 SERPL-SCNC: 24 MMOL/L (ref 23–31)
CREAT CL PREDICTED SERPL C-G-VRATE: 56 ML/MIN (ref 70–130)
GLUCOSE SERPL-MCNC: 91 MG/DL (ref 83–110)
POTASSIUM SERPL-SCNC: 4.5 MMOL/L (ref 3.5–5.1)
SODIUM SERPL-SCNC: 136 MMOL/L (ref 136–145)

## 2020-09-02 NOTE — PDOC.HOSPP
- Subjective


Encounter Date: 09/02/20


Encounter Time: 12:07


Subjective: 





Mr. Neal was seen today in follow-up. No complaints.





- Objective


Vital Signs & Weight: 


 Vital Signs (12 hours)











  Temp Pulse Resp BP Pulse Ox


 


 09/02/20 11:18  97.4 F L  51 L  16  103/52 L  95


 


 09/02/20 08:00      94 L


 


 09/02/20 07:19  97.6 F  52 L  18  146/62 H  94 L


 


 09/02/20 00:37  97.6 F  59 L  17  154/65 H  97








 Weight











Weight                         182 lb 4 oz














I&O: 


 











 09/01/20 09/02/20 09/03/20





 06:59 06:59 06:59


 


Intake Total 200 800 360


 


Output Total  2025 


 


Balance 200 -1225 360











Result Diagrams: 


 09/01/20 05:14





 09/02/20 06:10





Hospitalist ROS





- Medication


Medications: 


Active Medications











Generic Name Dose Route Start Last Admin





  Trade Name Freq  PRN Reason Stop Dose Admin


 


Aspirin  325 mg  09/01/20 09:00  09/02/20 08:37





  Ecotrin  PO   325 mg





  DAILY LAITH   Administration





     





     





     





     


 


Atorvastatin Calcium  20 mg  09/01/20 21:00  09/01/20 20:48





  Lipitor  PO   20 mg





  HS LAITH   Administration





     





     





     





     


 


Famotidine  20 mg  09/01/20 09:00  09/02/20 08:37





  Pepcid  PO   20 mg





  0900 LAITH   Administration





     





     





     





     


 


Isosorbide Mononitrate  30 mg  09/01/20 09:00  09/02/20 08:37





  Imdur Er  PO   30 mg





  DAILY LAITH   Administration





     





     





     





     


 


Melatonin  9 mg  09/01/20 21:00  09/01/20 20:49





  Melatonin  PO   9 mg





  HS LAITH   Administration





     





     





     





     


 


Metoprolol Tartrate  50 mg  09/01/20 09:00  09/02/20 08:37





  Lopressor  PO   50 mg





  BID LAITH   Administration





     





     





     





     


 


Pramipexole Dihydrochloride  1.5 mg  09/01/20 21:00  09/01/20 20:48





  Mirapex  PO   1.5 mg





  QPM LAITH   Administration





     





     





     





     


 


Ranolazine  1,000 mg  09/01/20 09:00  09/02/20 08:37





  Ranexa  PO   1,000 mg





  BID LAITH   Administration





     





     





     





     


 


Saccharomyces Boulardii  250 mg  09/01/20 09:00  09/02/20 08:37





  Florastor  PO   250 mg





  DAILY LAITH   Administration





     





     





     





     














- Exam


Eye: PERRL, anicteric sclera


Heart: RRR, no murmur, no gallops, no rubs, normal peripheral pulses


Respiratory: CTAB, no wheezes, no rales, no ronchi, normal chest expansion, no 

tachypnea, normal percussion


Gastrointestinal: soft, non-tender, non-distended, normal bowel sounds, no 

palpable masses, no hepatomegaly


Extremities: no cyanosis, no edema





Hosp A/P


(1) Hyperkalemia


Code(s): E87.5 - HYPERKALEMIA   Status: Acute   





(2) Chronic kidney disease, stage 3


Code(s): N18.3 - CHRONIC KIDNEY DISEASE, STAGE 3 (MODERATE)   Status: Acute   





(3) S/P CABG x 4


Status: Acute   





(4) CAD (coronary artery disease)


Code(s): I25.10 - ATHSCL HEART DISEASE OF NATIVE CORONARY ARTERY W/O ANG PCTRS 

  Status: Chronic   





(5) GERD (gastroesophageal reflux disease)


Code(s): K21.9 - GASTRO-ESOPHAGEAL REFLUX DISEASE WITHOUT ESOPHAGITIS   Status: 

Chronic   





- Plan





* Hyperkalemia- resolved


* Renal ultrasound was reviewed


* Stable for discharge home

## 2020-09-02 NOTE — PRG
DATE OF SERVICE:  09/02/2020



SUBJECTIVE:  Patient was seen and examined at bedside and overnight events noted.

Patient denies any shortness of breath or chest pain or palpitation.  No history of

nausea or vomiting or diarrhea or fever or chills or cramps. 



OBJECTIVE:  GENERAL:  This is a well-built male, in no apparent distress. 

VITAL SIGNS:  Temperature 97.4.  Heart Rate 51.  Respiratory rate 16.  Blood

pressure 103/52. 

HEENT:  Atraumatic, normocephalic. Oral mucosa is moist. 

Neck:  Supple. 

Cardiovascular:  S1, S2 heard.  Rate and rhythm regular. 

Respiratory:  Clear to auscultation. 

Gastrointestinal:  Abdomen is soft. 

Musculoskeletal:  No tenderness.  No edema. 

Dermatologic:  No skin rash. 

Neurologic:  Alert and awake and oriented x3.  No focal neurologic deficits. Moving

all the extremities. 

Psychiatric:  Mood and affect normal.



LABORATORY DATA:  Potassium 4.5, BUN is 27, creatinine is 1.07.



ASSESSMENT AND PLAN:  

1. Acute kidney injury, much better.

2. Hyperkalemia, better.  Continue to limit potassium intake.

3. Acidosis, stable.

4. Anemia of chronic disease.

5. Hypotension. 

Bicarb drip stopped.  Limit potassium intake.  We will monitor.  Continue to hold

lisinopril. 







Job ID:  [id number]

## 2020-09-02 NOTE — ULT
BILATERAL RENAL ULTRASOUND WITH GRAY SCALE AND COLOR FLOW AND SPECTRAL DOPPLER IMAGING:

 

Date:  09/02/2020

 

HISTORY:  

BPH and acute renal failure. 

 

FINDINGS:

The right kidney measures 13.4 cm in length and the left kidney measures 12.9 cm in length. No hydron
ephrosis seen on either side. There are multiple cysts in the right kidneys, the largest measuring ab
out 9.0 cm. There is a 1.8 cm cyst in the inferior left kidney. 

 

The peak systolic velocity in the right renal artery measures 124 cm/second with a renal artery/aorti
c ratio of 1.74. The left renal artery is not visualized due to bowel gas. 

 

The resistive index on the right is 0.63 and on the left is 0.61. 

 

IMPRESSION: 

Bilateral renal cysts. No evidence of high grade obstruction. 

 

 

POS: OFF

## 2020-09-03 NOTE — DIS
DATE OF ADMISSION:  09/01/2020



DATE OF DISCHARGE:  09/02/2020



PRIMARY CARE PHYSICIAN:  Dave Monterroso MD



DISCHARGE DISPOSITION:  Home.



DISCHARGE DIAGNOSES:  

1. Hyperkalemia.

2. Acute kidney failure.

3. Obstructive sleep apnea.

4. Gastroesophageal reflux disease.

5. Restless legs syndrome.

6. History of right-sided breast cancer.

7. Benign prostatic hyperplasia.



DISCHARGE MEDICATIONS:  Include;

1. Aspirin 325 mg p.o. daily.

2. Ranexa 1000 mg b.i.d.

3. Mirapex 1.5 mg daily.

4. Lopressor 50 mg p.o. b.i.d.

5. Melatonin 10 mg at bedtime.

6. Isosorbide mononitrate 25 mg daily.

7. Fluocinolone one application twice a day.

8. Lipitor 20 mg p.o. at bedtime.

9. Alclometasone dipropionate 60 g daily.



IMAGING DONE DURING HOSPITAL STAY:  The patient had a renal ultrasound done showing

bilateral renal cyst.  There was no evidence of any high-grade stenosis. 



CODE STATUS:  Full code.



ALLERGIES:  TO THE MYCINS.



HOSPITAL COURSE:  Mr. Neal is an 88-year-old gentleman, who was found to have

hyperkalemia on routine lab screening.  His potassium was 6.2.  He was directly

admitted to the hospital for management of this.  He had been on lisinopril and

Lasix, which was discontinued.  However, his potassium remained high the following

afternoon.  After this reason, Nephrology was consulted.  He was noted to be mildly

acidotic and was placed on a bicarb drip by the nephrologist.  He was placed on a

low-potassium diet.  Renal ultrasound was done to rule out obstructive uropathy

given he has BPH and had some urinary symptoms.  This proved to be negative and once

his potassium was corrected, he was able to be discharged home.  He is to have close

outpatient followup with Dr. Villarreal with Nephrology.  He is to be off lisinopril

and Lasix.  He has been asked to talk with Dr. Valdes as to which would be the

best replacement medication for the lisinopril and to follow up with his primary

care physician in 1 to 2 weeks. 







Job ID:  [id number]

## 2020-09-04 NOTE — ULT
BILATERAL RENAL ULTRASOUND WITH GRAY SCALE AND COLOR FLOW AND SPECTRAL DOPPLER IMAGING:

 

Date:  09/02/2020

 

HISTORY:  

BPH and acute renal failure. 

 

FINDINGS:

The right kidney measures 13.4 cm in length and the left kidney measures 12.9 cm in length. No hydron
ephrosis seen on either side. There are multiple cysts in the right kidneys, the largest measuring ab
out 9.0 cm. There is a 1.8 cm cyst in the inferior left kidney. 

 

The peak systolic velocity in the right renal artery measures 124 cm/second with a renal artery/aorti
c ratio of 1.74. The left renal artery is not visualized due to bowel gas. 

 

The resistive index on the right is 0.63 and on the left is 0.61. 

 

IMPRESSION: 

Bilateral renal cysts. No evidence of high grade obstruction.

## 2021-11-02 ENCOUNTER — HOSPITAL ENCOUNTER (OUTPATIENT)
Dept: HOSPITAL 92 - LABBT | Age: 86
Discharge: HOME | End: 2021-11-02
Attending: UROLOGY
Payer: MEDICARE

## 2021-11-02 DIAGNOSIS — N40.1: ICD-10-CM

## 2021-11-02 DIAGNOSIS — Z01.818: Primary | ICD-10-CM

## 2021-11-02 DIAGNOSIS — Z20.822: ICD-10-CM

## 2021-11-02 LAB
ANION GAP SERPL CALC-SCNC: 11 MMOL/L (ref 10–20)
BUN SERPL-MCNC: 32 MG/DL (ref 8.4–25.7)
CALCIUM SERPL-MCNC: 8.3 MG/DL (ref 7.8–10.44)
CHLORIDE SERPL-SCNC: 108 MMOL/L (ref 98–107)
CO2 SERPL-SCNC: 22 MMOL/L (ref 23–31)
CREAT CL PREDICTED SERPL C-G-VRATE: 0 ML/MIN (ref 70–130)
GLUCOSE SERPL-MCNC: 105 MG/DL (ref 83–110)
HGB BLD-MCNC: 9.9 G/DL (ref 13.5–17.5)
MCH RBC QN AUTO: 35.6 PG (ref 27–33)
MCV RBC AUTO: 104.3 FL (ref 81.2–95.1)
PLATELET # BLD AUTO: 176 10X3/UL (ref 150–450)
POTASSIUM SERPL-SCNC: 4 MMOL/L (ref 3.5–5.1)
RBC # BLD AUTO: 2.78 10X6/UL (ref 4.32–5.72)
RBC UR QL AUTO: (no result) HPF (ref 0–3)
SODIUM SERPL-SCNC: 137 MMOL/L (ref 136–145)
SP GR UR STRIP: 1.01 (ref 1–1.04)
WBC # BLD AUTO: 4.7 10X3/UL (ref 3.5–10.5)

## 2021-11-02 PROCEDURE — 87086 URINE CULTURE/COLONY COUNT: CPT

## 2021-11-02 PROCEDURE — 80048 BASIC METABOLIC PNL TOTAL CA: CPT

## 2021-11-02 PROCEDURE — 81001 URINALYSIS AUTO W/SCOPE: CPT

## 2021-11-02 PROCEDURE — U0005 INFEC AGEN DETEC AMPLI PROBE: HCPCS

## 2021-11-02 PROCEDURE — U0003 INFECTIOUS AGENT DETECTION BY NUCLEIC ACID (DNA OR RNA); SEVERE ACUTE RESPIRATORY SYNDROME CORONAVIRUS 2 (SARS-COV-2) (CORONAVIRUS DISEASE [COVID-19]), AMPLIFIED PROBE TECHNIQUE, MAKING USE OF HIGH THROUGHPUT TECHNOLOGIES AS DESCRIBED BY CMS-2020-01-R: HCPCS

## 2021-11-02 PROCEDURE — 93010 ELECTROCARDIOGRAM REPORT: CPT

## 2021-11-02 PROCEDURE — 85027 COMPLETE CBC AUTOMATED: CPT

## 2021-11-02 PROCEDURE — 93005 ELECTROCARDIOGRAM TRACING: CPT

## 2021-12-22 ENCOUNTER — HOSPITAL ENCOUNTER (OUTPATIENT)
Dept: HOSPITAL 92 - CSHSDC/OP | Age: 86
Discharge: HOME | End: 2021-12-22
Attending: PHYSICIAN ASSISTANT
Payer: MEDICARE

## 2021-12-22 DIAGNOSIS — U07.1: ICD-10-CM

## 2021-12-22 DIAGNOSIS — Z23: Primary | ICD-10-CM

## 2021-12-22 PROCEDURE — M0243 CASIRIVI AND IMDEVI INFUSION: HCPCS

## 2021-12-22 PROCEDURE — 96365 THER/PROPH/DIAG IV INF INIT: CPT

## 2022-01-17 NOTE — PDOC.PN
- Subjective


Encounter Start Date: 04/30/19


Encounter Start Time: 08:00





Pt seen for followup re: sepsis.  Feels better.  Ambulating in hallways.





- Objective


Resuscitation Status - Order Detail:





04/29/19 11:16


Resuscitation Status Routine 


   Resuscitation Status: DNAR: NO Resuscitation


   Discussed with: patient








MAR Reviewed: Yes


Vital Signs & Weight: 


 Vital Signs (12 hours)











  Temp Pulse Resp BP BP BP Pulse Ox


 


 04/30/19 11:43  99.1 F  70  16   140/60   93 L


 


 04/30/19 09:03   74   140/57 L   


 


 04/30/19 08:00        93 L


 


 04/30/19 07:45  98.4 F  74  16    140/57 L  92 L


 


 04/30/19 04:05  99.2 F  76  16   150/66 H   92 L








 Weight











Weight                         205 lb 6.4 oz














I&O: 


 











 04/29/19 04/30/19 05/01/19





 06:59 06:59 06:59


 


Intake Total 400 2450 


 


Balance 400 2450 











Result Diagrams: 


 04/30/19 06:13





 04/30/19 06:13


Additional Labs: 





labs reviewed by me





Phys Exam





- Physical Examination


Obese


HEENT: moist MMs, sclera anicteric, oral pharynx no lesions, 2+ tonsils


Neck: no nodes, no JVD, supple, full ROM


Respiratory: clear to auscultation bilateral


Cardiovascular: RRR, no rub


S1, s2


Gastrointestinal: soft, non-tender, no distention, positive bowel sounds


Neurological: moves all 4 limbs


Psychiatric: normal affect, A&O x 3


Deviation from normal: RLE rash much better





Dx/Plan


(1) Sepsis


Code(s): A41.9 - SEPSIS, UNSPECIFIED ORGANISM   Status: Acute   Comment: 

Improving.  Continue IV vancomycin and Zosyn, await cultures   





(2) CAD (coronary artery disease)


Code(s): I25.10 - ATHSCL HEART DISEASE OF NATIVE CORONARY ARTERY W/O ANG PCTRS 

  Status: Chronic   Comment: stable   





(3) GERD (gastroesophageal reflux disease)


Code(s): K21.9 - GASTRO-ESOPHAGEAL REFLUX DISEASE WITHOUT ESOPHAGITIS   Status: 

Chronic   Comment: stable   





(4) RLS (restless legs syndrome)


Status: Chronic   Comment: stable   





- Plan


plan discussed w/ family, continue antibiotics, out of bed/ambulate





* .








Review of Systems





- Review of Systems


Constitutional: fever.  negative: chills, sweats, weakness, malaise


Cardiovascular: negative: chest pain, palpitations, orthopnea, paroxysmal 

nocturnal dyspnea, edema, light headedness


Gastrointestinal: negative: Nausea, Vomiting, Abdominal Pain, Diarrhea, 

Constipation, Melena, Hematochezia


Genitourinary: negative: Dysuria, Frequency, Incontinence, Hematuria, Retention


Skin: Rash.  negative: Lesions, Justin, Bruising





- Medications/Allergies


Allergies/Adverse Reactions: 


 Allergies











Allergy/AdvReac Type Severity Reaction Status Date / Time


 


Mycins Allergy Mild digestive Uncoded 04/29/19 03:37





   upset-  





   diarrhea  











Medications: 


 Current Medications





Acetaminophen (Tylenol)  650 mg PO Q4H PRN


   PRN Reason: Headache/Fever/Mild Pain (1-3)


   Last Admin: 04/30/19 00:01 Dose:  650 mg


Acidophilus (Floranex)  1 tab PO DAILY [facility]


   Last Admin: 04/30/19 09:03 Dose:  1 tab


Aspirin (Ecotrin)  325 mg PO DAILY [facility]


   Last Admin: 04/30/19 09:03 Dose:  325 mg


Atorvastatin Calcium (Lipitor)  20 mg PO HS [facility]


   Last Admin: 04/29/19 20:35 Dose:  20 mg


Betamethasone Valerate (Valisone 0.1% Ointment)  0 gm TOP BID PRN


   PRN Reason: PSORIASIS


Bisacodyl (Dulcolax)  10 mg PO DAILYPRN PRN


   PRN Reason: Constipation


Hydrocortisone/Aloe (Hydrocortisone 1% Cream)  0 gm TOP DAILYPRN PRN


   PRN Reason: PSORIASIS


Piperacillin Sod/Tazobactam (Sod 3.375 gm/ Sodium Chloride)  100 mls @ 200 mls/

hr IVPB 0300,0900,1500,2100 [facility]


   Last Admin: 04/30/19 08:57 Dose:  100 mls


Vancomycin HCl 1.5 gm/ Sodium (Chloride)  300 mls @ 200 mls/hr IVPB 1800 [facility]


   Last Admin: 04/29/19 17:04 Dose:  300 mls


Isosorbide Mononitrate (Imdur Er)  30 mg PO DAILY [facility]


   Last Admin: 04/30/19 09:03 Dose:  30 mg


Labetalol HCl (Normodyne)  10 mg SLOW IVP Q4H PRN


   PRN Reason: SBP>170 OR DBP>100


Lisinopril (Zestril)  5 mg PO DAILY [facility]


   Last Admin: 04/30/19 09:03 Dose:  5 mg


Melatonin (Melatonin)  9 mg PO HS [facility]


   Last Admin: 04/29/19 20:35 Dose:  9 mg


Metoprolol Tartrate (Lopressor)  50 mg PO BID [facility]


   Last Admin: 04/30/19 09:04 Dose:  50 mg


Miscellaneous Medication (Pharmacy To Dose)  1 each IVPB PRN PRN


   PRN Reason: Pharmacy to dose


Pramipexole Dihydrochloride (Mirapex)  1.5 mg PO 1700 [facility]


   Last Admin: 04/29/19 16:08 Dose:  1.5 mg


Ranolazine (Ranexa)  1,000 mg PO BID [facility]


   Last Admin: 04/30/19 09:03 Dose:  1,000 mg
- Subjective


Encounter Start Date: 05/01/19


Encounter Start Time: 08:20





Pt sen for followup re: diarrhea.  Says he feels better.  Has diarrhea today.





- Objective


Resuscitation Status - Order Detail:





04/29/19 11:16


Resuscitation Status Routine 


   Resuscitation Status: DNAR: NO Resuscitation


   Discussed with: patient








MAR Reviewed: Yes


Vital Signs & Weight: 


 Vital Signs (12 hours)











  Temp Pulse Resp BP BP Pulse Ox


 


 05/01/19 16:00  98.2 F  69  18   148/71 H  94 L


 


 05/01/19 11:46  98.7 F  69  18   144/71 H  95


 


 05/01/19 08:53   73   145/87 H  


 


 05/01/19 08:50       93 L


 


 05/01/19 08:00  99.7 F H  73  18   145/87 H  93 L








 Weight











Weight                         205 lb 6.4 oz














I&O: 


 











 04/30/19 05/01/19 05/02/19





 06:59 06:59 06:59


 


Intake Total 2450 2250 


 


Balance 2450 2250 











Result Diagrams: 


 05/01/19 06:01





 05/01/19 06:01


Additional Labs: 





Labs reviewed by me





Phys Exam





- Physical Examination


Obese


HEENT: moist MMs


Neck: no JVD


Respiratory: clear to auscultation bilateral


Cardiovascular: RRR


Gastrointestinal: non-tender, positive bowel sounds


Neurological: moves all 4 limbs


Psychiatric: normal affect





Dx/Plan


(1) Diarrhea


Code(s): R19.7 - DIARRHEA, UNSPECIFIED   Status: Acute   Comment: Check stool 

for C. difficile, start Florastor   





(2) CAD (coronary artery disease)


Code(s): I25.10 - ATHSCL HEART DISEASE OF NATIVE CORONARY ARTERY W/O ANG PCTRS 

  Status: Chronic   Comment: stable   





(3) GERD (gastroesophageal reflux disease)


Code(s): K21.9 - GASTRO-ESOPHAGEAL REFLUX DISEASE WITHOUT ESOPHAGITIS   Status: 

Chronic   Comment: stable   





(4) RLS (restless legs syndrome)


Status: Chronic   Comment: stable   





(5) Sepsis


Code(s): A41.9 - SEPSIS, UNSPECIFIED ORGANISM   Status: Resolved   Comment: All 

cultures negative, discontinue antibiotics and observe   





- Plan





* .








Review of Systems





- Review of Systems


Constitutional: negative: fever, chills, sweats, weakness, malaise


Gastrointestinal: Diarrhea.  negative: Nausea, Vomiting, Abdominal Pain, 

Constipation, Melena, Hematochezia





- Medications/Allergies


Allergies/Adverse Reactions: 


 Allergies











Allergy/AdvReac Type Severity Reaction Status Date / Time


 


Mycins Allergy Mild digestive Uncoded 04/29/19 03:37





   upset-  





   diarrhea  











Medications: 


 Current Medications





Acetaminophen (Tylenol)  650 mg PO Q4H PRN


   PRN Reason: Headache/Fever/Mild Pain (1-3)


   Last Admin: 04/30/19 00:01 Dose:  650 mg


Acidophilus (Floranex)  1 tab PO DAILY [facility]


   Last Admin: 05/01/19 08:53 Dose:  1 tab


Aspirin (Ecotrin)  325 mg PO DAILY [facility]


   Last Admin: 05/01/19 08:53 Dose:  325 mg


Atorvastatin Calcium (Lipitor)  20 mg PO HS [facility]


   Last Admin: 04/30/19 21:01 Dose:  20 mg


Betamethasone Valerate (Valisone 0.1% Ointment)  0 gm TOP BID PRN


   PRN Reason: PSORIASIS


Bisacodyl (Dulcolax)  10 mg PO DAILYPRN PRN


   PRN Reason: Constipation


Hydrocortisone/Aloe (Hydrocortisone 1% Cream)  0 gm TOP DAILYPRN PRN


   PRN Reason: PSORIASIS


Isosorbide Mononitrate (Imdur Er)  30 mg PO DAILY [facility]


   Last Admin: 05/01/19 08:53 Dose:  30 mg


Labetalol HCl (Normodyne)  10 mg SLOW IVP Q4H PRN


   PRN Reason: SBP>170 OR DBP>100


Lisinopril (Zestril)  5 mg PO DAILY [facility]


   Last Admin: 05/01/19 08:53 Dose:  5 mg


Melatonin (Melatonin)  9 mg PO HS [facility]


   Last Admin: 04/30/19 21:01 Dose:  9 mg


Metoprolol Tartrate (Lopressor)  50 mg PO BID [facility]


   Last Admin: 05/01/19 08:53 Dose:  50 mg


Pramipexole Dihydrochloride (Mirapex)  1.5 mg PO 1700 [facility]


   Last Admin: 04/30/19 16:58 Dose:  1.5 mg


Ranolazine (Ranexa)  1,000 mg PO BID [facility]


   Last Admin: 05/01/19 08:53 Dose:  1,000 mg


Saccharomyces Boulardii (Florastor)  250 mg PO Q24H [facility]


   Last Admin: 05/01/19 11:56 Dose:  250 mg
Home